# Patient Record
Sex: FEMALE | Race: WHITE | Employment: UNEMPLOYED | URBAN - METROPOLITAN AREA
[De-identification: names, ages, dates, MRNs, and addresses within clinical notes are randomized per-mention and may not be internally consistent; named-entity substitution may affect disease eponyms.]

---

## 2022-11-08 ENCOUNTER — HOSPITAL ENCOUNTER (EMERGENCY)
Age: 37
Discharge: HOME | End: 2022-11-08
Attending: GENERAL PRACTICE

## 2022-11-08 ENCOUNTER — APPOINTMENT (OUTPATIENT)
Dept: CT IMAGING | Age: 37
End: 2022-11-08

## 2022-11-08 VITALS
WEIGHT: 180 LBS | HEART RATE: 78 BPM | HEIGHT: 69 IN | TEMPERATURE: 98.2 F | BODY MASS INDEX: 26.66 KG/M2 | OXYGEN SATURATION: 99 % | SYSTOLIC BLOOD PRESSURE: 115 MMHG | RESPIRATION RATE: 20 BRPM | DIASTOLIC BLOOD PRESSURE: 82 MMHG

## 2022-11-08 DIAGNOSIS — K52.9 ENTERITIS: Primary | ICD-10-CM

## 2022-11-08 LAB
ALBUMIN SERPL-MCNC: 3.6 G/DL (ref 3.5–5)
ALBUMIN/GLOB SERPL: 0.9 {RATIO} (ref 0.4–1.6)
ALP SERPL-CCNC: 56 U/L (ref 50–136)
ALT SERPL-CCNC: 21 U/L (ref 12–65)
ANION GAP SERPL CALC-SCNC: 8 MMOL/L (ref 2–11)
AST SERPL-CCNC: 13 U/L (ref 15–37)
BASOPHILS # BLD: 0 K/UL (ref 0–0.2)
BASOPHILS NFR BLD: 0 % (ref 0–2)
BILIRUB SERPL-MCNC: 0.4 MG/DL (ref 0.2–1.1)
BILIRUB UR QL: NEGATIVE
BUN SERPL-MCNC: 11 MG/DL (ref 6–23)
CALCIUM SERPL-MCNC: 9.1 MG/DL (ref 8.3–10.4)
CHLORIDE SERPL-SCNC: 108 MMOL/L (ref 101–110)
CO2 SERPL-SCNC: 23 MMOL/L (ref 21–32)
CREAT SERPL-MCNC: 1 MG/DL (ref 0.6–1)
DIFFERENTIAL METHOD BLD: ABNORMAL
EOSINOPHIL # BLD: 0.1 K/UL (ref 0–0.8)
EOSINOPHIL NFR BLD: 1 % (ref 0.5–7.8)
ERYTHROCYTE [DISTWIDTH] IN BLOOD BY AUTOMATED COUNT: 13.1 % (ref 11.9–14.6)
GLOBULIN SER CALC-MCNC: 3.9 G/DL (ref 2.8–4.5)
GLUCOSE SERPL-MCNC: 119 MG/DL (ref 65–100)
GLUCOSE UR QL STRIP.AUTO: NEGATIVE MG/DL
HCT VFR BLD AUTO: 40.3 % (ref 35.8–46.3)
HGB BLD-MCNC: 13.5 G/DL (ref 11.7–15.4)
IMM GRANULOCYTES # BLD AUTO: 0.1 K/UL (ref 0–0.5)
IMM GRANULOCYTES NFR BLD AUTO: 0 % (ref 0–5)
KETONES UR-MCNC: NEGATIVE MG/DL
LEUKOCYTE ESTERASE UR QL STRIP: NEGATIVE
LIPASE SERPL-CCNC: 88 U/L (ref 73–393)
LYMPHOCYTES # BLD: 2.5 K/UL (ref 0.5–4.6)
LYMPHOCYTES NFR BLD: 15 % (ref 13–44)
MAGNESIUM SERPL-MCNC: 1.8 MG/DL (ref 1.8–2.4)
MCH RBC QN AUTO: 30.5 PG (ref 26.1–32.9)
MCHC RBC AUTO-ENTMCNC: 33.5 G/DL (ref 31.4–35)
MCV RBC AUTO: 91 FL (ref 82–102)
MONOCYTES # BLD: 0.7 K/UL (ref 0.1–1.3)
MONOCYTES NFR BLD: 4 % (ref 4–12)
NEUTS SEG # BLD: 13.3 K/UL (ref 1.7–8.2)
NEUTS SEG NFR BLD: 80 % (ref 43–78)
NITRITE UR QL: NEGATIVE
NRBC # BLD: 0 K/UL (ref 0–0.2)
PH UR: 6 [PH] (ref 5–9)
PLATELET # BLD AUTO: 253 K/UL (ref 150–450)
PMV BLD AUTO: 9.4 FL (ref 9.4–12.3)
POTASSIUM SERPL-SCNC: 3.5 MMOL/L (ref 3.5–5.1)
PROT SERPL-MCNC: 7.5 G/DL (ref 6.3–8.2)
PROT UR QL: NEGATIVE MG/DL
RBC # BLD AUTO: 4.43 M/UL (ref 4.05–5.2)
RBC # UR STRIP: NEGATIVE /UL
SODIUM SERPL-SCNC: 139 MMOL/L (ref 133–143)
SP GR UR: >1.03 (ref 1–1.02)
UROBILINOGEN UR QL: 0.2 EU/DL (ref 0.2–1)
WBC # BLD AUTO: 16.8 K/UL (ref 4.3–11.1)

## 2022-11-08 PROCEDURE — 2580000003 HC RX 258: Performed by: GENERAL PRACTICE

## 2022-11-08 PROCEDURE — 80053 COMPREHEN METABOLIC PANEL: CPT

## 2022-11-08 PROCEDURE — 94762 N-INVAS EAR/PLS OXIMTRY CONT: CPT

## 2022-11-08 PROCEDURE — 83735 ASSAY OF MAGNESIUM: CPT

## 2022-11-08 PROCEDURE — 83690 ASSAY OF LIPASE: CPT

## 2022-11-08 PROCEDURE — 81003 URINALYSIS AUTO W/O SCOPE: CPT

## 2022-11-08 PROCEDURE — 6360000004 HC RX CONTRAST MEDICATION

## 2022-11-08 PROCEDURE — 96361 HYDRATE IV INFUSION ADD-ON: CPT

## 2022-11-08 PROCEDURE — 96374 THER/PROPH/DIAG INJ IV PUSH: CPT

## 2022-11-08 PROCEDURE — 85025 COMPLETE CBC W/AUTO DIFF WBC: CPT

## 2022-11-08 PROCEDURE — 99285 EMERGENCY DEPT VISIT HI MDM: CPT

## 2022-11-08 PROCEDURE — 2580000003 HC RX 258

## 2022-11-08 PROCEDURE — 6360000002 HC RX W HCPCS: Performed by: GENERAL PRACTICE

## 2022-11-08 PROCEDURE — 74177 CT ABD & PELVIS W/CONTRAST: CPT

## 2022-11-08 RX ORDER — DROSPIRENONE AND ETHINYL ESTRADIOL 0.02-3(28)
1 KIT ORAL DAILY
COMMUNITY
Start: 2019-06-26

## 2022-11-08 RX ORDER — 0.9 % SODIUM CHLORIDE 0.9 %
100 INTRAVENOUS SOLUTION INTRAVENOUS ONCE
Status: COMPLETED | OUTPATIENT
Start: 2022-11-08 | End: 2022-11-08

## 2022-11-08 RX ORDER — ONDANSETRON 4 MG/1
4 TABLET, FILM COATED ORAL 3 TIMES DAILY PRN
Qty: 15 TABLET | Refills: 0 | Status: SHIPPED | OUTPATIENT
Start: 2022-11-08

## 2022-11-08 RX ORDER — DEXTROAMPHETAMINE SACCHARATE, AMPHETAMINE ASPARTATE MONOHYDRATE, DEXTROAMPHETAMINE SULFATE AND AMPHETAMINE SULFATE 7.5; 7.5; 7.5; 7.5 MG/1; MG/1; MG/1; MG/1
1 CAPSULE, EXTENDED RELEASE ORAL
COMMUNITY
Start: 2021-06-16

## 2022-11-08 RX ORDER — ONDANSETRON 2 MG/ML
4 INJECTION INTRAMUSCULAR; INTRAVENOUS ONCE
Status: COMPLETED | OUTPATIENT
Start: 2022-11-08 | End: 2022-11-08

## 2022-11-08 RX ORDER — SODIUM CHLORIDE 0.9 % (FLUSH) 0.9 %
10 SYRINGE (ML) INJECTION
Status: COMPLETED | OUTPATIENT
Start: 2022-11-08 | End: 2022-11-08

## 2022-11-08 RX ORDER — 0.9 % SODIUM CHLORIDE 0.9 %
1000 INTRAVENOUS SOLUTION INTRAVENOUS
Status: COMPLETED | OUTPATIENT
Start: 2022-11-08 | End: 2022-11-08

## 2022-11-08 RX ADMIN — ONDANSETRON 4 MG: 2 INJECTION INTRAMUSCULAR; INTRAVENOUS at 02:20

## 2022-11-08 RX ADMIN — SODIUM CHLORIDE, PRESERVATIVE FREE 10 ML: 5 INJECTION INTRAVENOUS at 05:12

## 2022-11-08 RX ADMIN — DIATRIZOATE MEGLUMINE AND DIATRIZOATE SODIUM 15 ML: 600; 100 SOLUTION ORAL; RECTAL at 03:52

## 2022-11-08 RX ADMIN — SODIUM CHLORIDE 100 ML: 9 INJECTION, SOLUTION INTRAVENOUS at 05:12

## 2022-11-08 RX ADMIN — SODIUM CHLORIDE 1000 ML: 900 INJECTION, SOLUTION INTRAVENOUS at 02:20

## 2022-11-08 RX ADMIN — IOPAMIDOL 100 ML: 755 INJECTION, SOLUTION INTRAVENOUS at 05:12

## 2022-11-08 ASSESSMENT — ENCOUNTER SYMPTOMS
ABDOMINAL PAIN: 1
BACK PAIN: 0
SINUS PRESSURE: 0
TROUBLE SWALLOWING: 0
NAUSEA: 0
SINUS PAIN: 0
VOMITING: 1
COUGH: 0
CONSTIPATION: 0
SHORTNESS OF BREATH: 0
COLOR CHANGE: 0

## 2022-11-08 ASSESSMENT — PAIN DESCRIPTION - ORIENTATION: ORIENTATION: LOWER

## 2022-11-08 ASSESSMENT — PAIN SCALES - GENERAL: PAINLEVEL_OUTOF10: 9

## 2022-11-08 ASSESSMENT — PAIN - FUNCTIONAL ASSESSMENT: PAIN_FUNCTIONAL_ASSESSMENT: 0-10

## 2022-11-08 ASSESSMENT — PAIN DESCRIPTION - LOCATION: LOCATION: ABDOMEN

## 2022-11-08 NOTE — DISCHARGE INSTRUCTIONS
Please take zofran as needed for vomiting. Drink plenty of fluids. Please follow up with primary care for reassessment.

## 2022-11-08 NOTE — ED TRIAGE NOTES
Pt presents N/V/D that began around 30 after pt ate dinner, lower abd cramping, denies any urinary s/s.

## 2022-11-08 NOTE — ED NOTES
I have reviewed discharge instructions with the patient. The patient verbalized understanding. Patient left ED via Discharge Method: ambulatory to Home with self. Opportunity for questions and clarification provided. Patient given 1 scripts. To continue your aftercare when you leave the hospital, you may receive an automated call from our care team to check in on how you are doing. This is a free service and part of our promise to provide the best care and service to meet your aftercare needs.  If you have questions, or wish to unsubscribe from this service please call [phone]. Thank you for Choosing our [facility] Emergency Department.           Radha Perkins RN  11/08/22 [id number]

## 2022-11-08 NOTE — ED PROVIDER NOTES
Emergency Department Provider Note                   PCP:                Donnie Summers MD               Age: 40 y.o. Sex: female       ICD-10-CM    1. Enteritis  K52.9           DISPOSITION Decision To Discharge 11/08/2022 06:31:47 AM        MDM  Number of Diagnoses or Management Options  Enteritis: new, needed workup  Diagnosis management comments: 36-year-old female presents afebrile with acute onset of vomiting with RLQ abdominal pain. CT scan shows enteritis. Patient did have a leukocytosis, this is likely secondary to vomiting. Patient has decrease in abdominal pain after fluids and Zofran. Patient discharged with prescription for Zofran. Discussed returning if she develops fevers. Amount and/or Complexity of Data Reviewed  Clinical lab tests: ordered and reviewed  Tests in the radiology section of CPT®: ordered and reviewed    Risk of Complications, Morbidity, and/or Mortality  Presenting problems: moderate  Diagnostic procedures: moderate  Management options: moderate    Patient Progress  Patient progress: stable       ED Course as of 11/08/22 0642   Tue Nov 08, 2022   0400 Evaluate lower abdominal pain with CT abdomen pelvis. Patient given 1 L of normal saline and Zofran, states that she is feeling somewhat better. [CJ]   0631 IMPRESSION:     1. Wall thickening of multiple small bowel loops in the right hemiabdomen,  consistent with enteritis (infectious or inflammatory etiology). Negative for  bowel obstruction, bowel perforation or intra-abdominal abscess. 2. Negative for colitis or diverticulitis. No hydronephrosis. [CJ]   [id number] Discussed patient with Dr. Papi Chaudhary, leukocytosis likely to due multiple episodes of vomiting. Will discharge patient with zofran prescription.   [CJ]      ED Course User Index  [CJ] Pearl Taylor, APRN - CNP        Orders Placed This Encounter   Procedures    CT ABDOMEN PELVIS W IV CONTRAST Additional Contrast? Oral    CBC with Auto Differential Comprehensive Metabolic Panel    Lipase    Magnesium    Continuous Pulse Oximetry    POCT Urine Dipstick    POCT Urinalysis no Micro        Medications   diatrizoate meglumine-sodium (GASTROGRAFIN) 66-10 % solution 15 mL (15 mLs Oral Given 11/8/22 0352)   ondansetron (ZOFRAN) injection 4 mg (4 mg IntraVENous Given 11/8/22 0220)   0.9 % sodium chloride bolus (0 mLs IntraVENous Stopped 11/8/22 0251)   iopamidol (ISOVUE-370) 76 % injection 100 mL (100 mLs IntraVENous Given 11/8/22 0512)   0.9 % sodium chloride bolus (0 mLs IntraVENous Stopped 11/8/22 0513)   sodium chloride flush 0.9 % injection 10 mL (10 mLs IntraVENous Given 11/8/22 0512)       New Prescriptions    ONDANSETRON (ZOFRAN) 4 MG TABLET    Take 1 tablet by mouth 3 times daily as needed for Nausea or Vomiting        Valentine Davis is a 40 y.o. female who presents to the Emergency Department with chief complaint of    Chief Complaint   Patient presents with    Abdominal Pain      52-year-old female with history of appendectomy no past medical history presents for 4-hour onset of lower abdominal pain, nausea and vomiting. Reports onset of pain was around 30 minutes after eating dinner that she ate [employer] food. Sharp pain to her lower abdomen that is intermittent and cramping at times. Last menstrual cycle was 2 weeks ago and light, states that she takes oral birth control and only uses the placebo pills for 3 days. Denies dysuria, fever, chills.       Abdominal Pain  Pain location:  LLQ and RLQ  Pain quality: aching and cramping    Pain radiates to:  Does not radiate  Pain severity:  Severe  Onset quality:  Sudden  Duration:  4 hours  Timing:  Constant  Progression:  Worsening  Chronicity:  New  Context: retching and suspicious food intake    Context: not alcohol use and not diet changes    Relieved by:  Nothing  Worsened by:  Nothing  Ineffective treatments:  None tried  Associated symptoms: vomiting    Associated symptoms: no chest pain, no chills, no constipation, no cough, no dysuria, no fatigue, no fever, no hematuria, no nausea and no shortness of breath    Risk factors: no alcohol abuse, no aspirin use, not elderly, has not had multiple surgeries, not obese and not pregnant        Review of Systems   Constitutional:  Negative for chills, diaphoresis, fatigue and fever. HENT:  Negative for ear pain, sinus pressure, sinus pain and trouble swallowing. Respiratory:  Negative for cough and shortness of breath. Cardiovascular:  Negative for chest pain and palpitations. Gastrointestinal:  Positive for abdominal pain and vomiting. Negative for constipation and nausea. Genitourinary:  Negative for difficulty urinating, dysuria, flank pain, frequency, hematuria, menstrual problem and urgency. Musculoskeletal:  Negative for arthralgias, back pain and myalgias. Skin:  Negative for color change, pallor, rash and wound. Neurological:  Negative for dizziness, weakness, numbness and headaches. Psychiatric/Behavioral:  Negative for agitation, behavioral problems and confusion. The patient is not nervous/anxious. Past Medical History:   Diagnosis Date    ADHD         Past Surgical History:   Procedure Laterality Date    APPENDECTOMY      BREAST REDUCTION SURGERY          History reviewed. No pertinent family history. Social History     Socioeconomic History    Marital status:      Spouse name: None    Number of children: None    Years of education: None    Highest education level: None   Tobacco Use    Smoking status: Never    Smokeless tobacco: Never   Substance and Sexual Activity    Alcohol use: Yes     Comment: socially    Drug use: Never         Patient has no known allergies. Previous Medications    AMPHETAMINE-DEXTROAMPHETAMINE (ADDERALL XR) 30 MG EXTENDED RELEASE CAPSULE    Take 1 capsule by mouth.     DROSPIRENONE-ETHINYL ESTRADIOL (TEO) 3-0.02 MG PER TABLET    Take 1 tablet by mouth daily        Vitals signs and nursing note reviewed. No data found. Physical Exam  Vitals and nursing note reviewed. Constitutional:       General: She is not in acute distress. Appearance: Normal appearance. She is not ill-appearing, toxic-appearing or diaphoretic. HENT:      Head: Normocephalic and atraumatic. Right Ear: Tympanic membrane and external ear normal. There is no impacted cerumen. Left Ear: Tympanic membrane and external ear normal. There is no impacted cerumen. Nose: Nose normal. No congestion or rhinorrhea. Mouth/Throat:      Mouth: Mucous membranes are moist.      Pharynx: No oropharyngeal exudate or posterior oropharyngeal erythema. Eyes:      Extraocular Movements: Extraocular movements intact. Pupils: Pupils are equal, round, and reactive to light. Cardiovascular:      Rate and Rhythm: Normal rate and regular rhythm. Pulses: Normal pulses. Heart sounds: Normal heart sounds. No murmur heard. No friction rub. No gallop. Pulmonary:      Effort: Pulmonary effort is normal. No respiratory distress. Breath sounds: Normal breath sounds. No stridor. No wheezing, rhonchi or rales. Abdominal:      General: Abdomen is flat. Bowel sounds are normal. There is no distension. Palpations: Abdomen is soft. There is no mass. Tenderness: There is abdominal tenderness in the right lower quadrant and left lower quadrant. There is no right CVA tenderness, left CVA tenderness, guarding or rebound. Hernia: No hernia is present. Musculoskeletal:         General: No swelling, tenderness, deformity or signs of injury. Normal range of motion. Cervical back: Normal range of motion. No rigidity or tenderness. Right lower leg: No edema. Left lower leg: No edema. Skin:     General: Skin is warm and dry. Coloration: Skin is not jaundiced or pale. Findings: No bruising or erythema. Neurological:      General: No focal deficit present.       Mental Status: She is alert and oriented to person, place, and time. Cranial Nerves: No cranial nerve deficit. Sensory: No sensory deficit. Motor: No weakness. Coordination: Coordination normal.   Psychiatric:         Mood and Affect: Mood normal.         Behavior: Behavior normal.         Thought Content: Thought content normal.         Judgment: Judgment normal.        Procedures    Results for orders placed or performed during the hospital encounter of 11/08/22   CT ABDOMEN PELVIS W IV CONTRAST Additional Contrast? Oral    Narrative    CT abdomen and pelvis with contrast     COMPARISON: None. INDICATION: Lower abdominal pain. WBC 16. TECHNIQUE: CT imaging was performed of the abdomen and pelvis following the  uncomplicated administration of intravenous contrast (Isovue 370, 100 mL). Oral  contrast was administered. Radiation dose reduction techniques were used for  this study:  Our CT scanners use one or all of the following: Automated exposure  control, adjustment of the mA and/or kVp according to patient's size, iterative  reconstruction. FINDINGS:    Visualized lung bases are unremarkable. Abdomen findings: The liver, gallbladder, pancreas, spleen, adrenal glands, kidneys, and abdominal  aorta are unremarkable. No evidence of lymphadenopathy. Stomach is normal in contour. Pelvic findings: There is wall thickening of multiple small bowel loops in the right hemiabdomen,  consistent with enteritis. No evidence of bowel perforation or abscess. The colon is normal in course and caliber with moderate fluid volume. Appendix  is surgically absent. Urinary bladder is normal in contour. Uterus is not well  evaluated. There is trace free fluid in the pelvis. There is no free air. There are  degenerative changes at L5-S1. Impression    1. Wall thickening of multiple small bowel loops in the right hemiabdomen,  consistent with enteritis (infectious or inflammatory etiology). Negative for  bowel obstruction, bowel perforation or intra-abdominal abscess. 2. Negative for colitis or diverticulitis. No hydronephrosis. CBC with Auto Differential   Result Value Ref Range    WBC 16.8 (H) 4.3 - 11.1 K/uL    RBC 4.43 4.05 - 5.2 M/uL    Hemoglobin 13.5 11.7 - 15.4 g/dL    Hematocrit 40.3 35.8 - 46.3 %    MCV 91.0 82 - 102 FL    MCH 30.5 26.1 - 32.9 PG    MCHC 33.5 31.4 - 35.0 g/dL    RDW 13.1 11.9 - 14.6 %    Platelets 988 971 - 767 K/uL    MPV 9.4 9.4 - 12.3 FL    nRBC 0.00 0.0 - 0.2 K/uL    Differential Type AUTOMATED      Seg Neutrophils 80 (H) 43 - 78 %    Lymphocytes 15 13 - 44 %    Monocytes 4 4.0 - 12.0 %    Eosinophils % 1 0.5 - 7.8 %    Basophils 0 0.0 - 2.0 %    Immature Granulocytes 0 0.0 - 5.0 %    Segs Absolute 13.3 (H) 1.7 - 8.2 K/UL    Absolute Lymph # 2.5 0.5 - 4.6 K/UL    Absolute Mono # 0.7 0.1 - 1.3 K/UL    Absolute Eos # 0.1 0.0 - 0.8 K/UL    Basophils Absolute 0.0 0.0 - 0.2 K/UL    Absolute Immature Granulocyte 0.1 0.0 - 0.5 K/UL   Comprehensive Metabolic Panel   Result Value Ref Range    Sodium 139 133 - 143 mmol/L    Potassium 3.5 3.5 - 5.1 mmol/L    Chloride 108 101 - 110 mmol/L    CO2 23 21 - 32 mmol/L    Anion Gap 8 2 - 11 mmol/L    Glucose 119 (H) 65 - 100 mg/dL    BUN 11 6 - 23 MG/DL    Creatinine 1.00 0.6 - 1.0 MG/DL    Est, Glom Filt Rate >60 >60 ml/min/1.73m2    Calcium 9.1 8.3 - 10.4 MG/DL    Total Bilirubin 0.4 0.2 - 1.1 MG/DL    ALT 21 12 - 65 U/L    AST 13 (L) 15 - 37 U/L    Alk Phosphatase 56 50 - 136 U/L    Total Protein 7.5 6.3 - 8.2 g/dL    Albumin 3.6 3.5 - 5.0 g/dL    Globulin 3.9 2.8 - 4.5 g/dL    Albumin/Globulin Ratio 0.9 0.4 - 1.6     Lipase   Result Value Ref Range    Lipase 88 73 - 393 U/L   Magnesium   Result Value Ref Range    Magnesium 1.8 1.8 - 2.4 mg/dL        CT ABDOMEN PELVIS W IV CONTRAST Additional Contrast? Oral   Final Result      1.  Wall thickening of multiple small bowel loops in the right hemiabdomen,   consistent with enteritis (infectious or inflammatory etiology). Negative for   bowel obstruction, bowel perforation or intra-abdominal abscess. 2. Negative for colitis or diverticulitis. No hydronephrosis. Voice dictation software was used during the making of this note. This software is not perfect and grammatical and other typographical errors may be present. This note has not been completely proofread for errors.      Chris Gallego, JAJA - LAYLA  11/08/22 [id number]